# Patient Record
Sex: MALE | URBAN - METROPOLITAN AREA
[De-identification: names, ages, dates, MRNs, and addresses within clinical notes are randomized per-mention and may not be internally consistent; named-entity substitution may affect disease eponyms.]

---

## 2023-03-30 ENCOUNTER — APPOINTMENT (RX ONLY)
Dept: URBAN - METROPOLITAN AREA CLINIC 17 | Facility: CLINIC | Age: 78
Setting detail: DERMATOLOGY
End: 2023-03-30

## 2023-03-30 DIAGNOSIS — Z41.9 ENCOUNTER FOR PROCEDURE FOR PURPOSES OTHER THAN REMEDYING HEALTH STATE, UNSPECIFIED: ICD-10-CM

## 2023-03-30 PROCEDURE — ? COSMETIC CONSULTATION: LASER RESURFACING

## 2023-03-30 PROCEDURE — ? COSMETIC CONSULTATION: BLEPHAROPLASTY

## 2023-03-30 ASSESSMENT — LOCATION SIMPLE DESCRIPTION DERM
LOCATION SIMPLE: LEFT EYEBROW
LOCATION SIMPLE: LEFT CHEEK
LOCATION SIMPLE: RIGHT CHEEK
LOCATION SIMPLE: RIGHT EYEBROW

## 2023-03-30 ASSESSMENT — LOCATION DETAILED DESCRIPTION DERM
LOCATION DETAILED: LEFT SUPERIOR CENTRAL MALAR CHEEK
LOCATION DETAILED: RIGHT CENTRAL EYEBROW
LOCATION DETAILED: RIGHT SUPERIOR MEDIAL MALAR CHEEK
LOCATION DETAILED: LEFT CENTRAL EYEBROW

## 2023-03-30 ASSESSMENT — LOCATION ZONE DERM: LOCATION ZONE: FACE

## 2023-05-04 ENCOUNTER — APPOINTMENT (RX ONLY)
Dept: URBAN - METROPOLITAN AREA CLINIC 17 | Facility: CLINIC | Age: 78
Setting detail: DERMATOLOGY
End: 2023-05-04

## 2023-05-04 DIAGNOSIS — Z41.9 ENCOUNTER FOR PROCEDURE FOR PURPOSES OTHER THAN REMEDYING HEALTH STATE, UNSPECIFIED: ICD-10-CM

## 2023-05-04 PROCEDURE — ? OTHER (COSMETIC)

## 2023-05-23 ENCOUNTER — APPOINTMENT (RX ONLY)
Dept: URBAN - METROPOLITAN AREA CLINIC 41 | Facility: CLINIC | Age: 78
Setting detail: DERMATOLOGY
End: 2023-05-23

## 2023-05-23 VITALS — SYSTOLIC BLOOD PRESSURE: 131 MMHG | HEART RATE: 52 BPM | DIASTOLIC BLOOD PRESSURE: 78 MMHG

## 2023-05-23 VITALS — SYSTOLIC BLOOD PRESSURE: 148 MMHG | HEART RATE: 52 BPM | DIASTOLIC BLOOD PRESSURE: 71 MMHG

## 2023-05-23 DIAGNOSIS — Z41.9 ENCOUNTER FOR PROCEDURE FOR PURPOSES OTHER THAN REMEDYING HEALTH STATE, UNSPECIFIED: ICD-10-CM

## 2023-05-23 PROCEDURE — ? BLEPHAROPLASTY

## 2023-05-23 ASSESSMENT — LOCATION DETAILED DESCRIPTION DERM
LOCATION DETAILED: LEFT MEDIAL INFERIOR PRETARSAL REGION
LOCATION DETAILED: RIGHT LATERAL SUPERIOR EYELID

## 2023-05-23 ASSESSMENT — LOCATION ZONE DERM: LOCATION ZONE: EYELID

## 2023-05-23 ASSESSMENT — LOCATION SIMPLE DESCRIPTION DERM
LOCATION SIMPLE: RIGHT SUPERIOR EYELID
LOCATION SIMPLE: LEFT MEDIAL INFERIOR PRETARSAL REGION

## 2023-05-23 NOTE — PROCEDURE: BLEPHAROPLASTY
Body Location Override (Optional - Billing Will Still Be Based On Selected Body Map Location If Applicable): upper eyelids
Detail Level: Generalized
Estimated Blood Loss (Cc): minimal
Skin Closure: simple interrupted
Skin Closure Sutures: 6-0 fast asborbing gut
Intro: Operative Note: The risks, benefits, and alternatives of upper lid blepharoplasty were discussed with the patient. Consent forms were signed. In the preoperative area with the patient positioned upright on the surgical chair, a marker pen was used to delineate the natural lid crease in the affected lids. The amount of redundant skin to be removed utilizing a pinch technique was determined and outlined. An intravenous line was established and operative monitors were placed. IV sedation and local anesthesia were obtained. The patient was placed in the supine position, then prepped and draped in the usual sterile fashion. Iodine swabs were used around the eyelids. A 15 blade was used to make a skin incision along the elliptical demarcations to excise the skin. Electrocautery was used to achieve hemostasis. The supplemental oxygen was moved aside during the use of electrocoagulation unit. The skin edges were sutured closed using 6-0 fast absorbing gut running suture.
Wound Care Applied To Incision Site: Vaseline
Body Location Override (Optional - Billing Will Still Be Based On Selected Body Map Location If Applicable): bilateral lower eyelids
Intro: Operative Note: The risk, benefits, and alternatives of lower lid blepharoplasty were discussed with the patient. Consent forms were signed. In the preoperative area with the patient positioned upright on the surgical chair, a marker pen was used to outline the underlying, pronounced infra-orbital fat pads. An intravenous line was established and operative monitors were placed. IV sedation and location anesthesia was obtained. Local anesthesia was obtained with lidocaine 1% with epinephrine (5cc). The patient was placed in a supine position, then prepared and draped in the usual sterile fashion. Iodine swabs were used around the eyelids. A 15 blade was used to make a sub-ciliary skin incision along the length of the lower eyelid. Care was taken to avoid the duct punctae. The sub-ciliary incision was carried deep to identify the sub-orbital fat pads. Once located, the fat pads were carefully dissected free and removed. Hemostasis was meticulously achieved using electrocautery. The skin incision was closed with 6-0 fast absorbing sutures.

## 2023-05-25 ENCOUNTER — APPOINTMENT (RX ONLY)
Dept: URBAN - METROPOLITAN AREA CLINIC 17 | Facility: CLINIC | Age: 78
Setting detail: DERMATOLOGY
End: 2023-05-25

## 2023-05-25 DIAGNOSIS — Z41.9 ENCOUNTER FOR PROCEDURE FOR PURPOSES OTHER THAN REMEDYING HEALTH STATE, UNSPECIFIED: ICD-10-CM

## 2023-05-25 PROCEDURE — ? COSMETIC FOLLOW-UP

## 2023-05-25 NOTE — PROCEDURE: COSMETIC FOLLOW-UP
Side Effects Or Complications: None
Side Effects Override (Free Text): bruising and swelling WNL
Detail Level: Detailed
Treatment Override (Free Text): upper blepharoplasty
Global Improvement: Very Good
Treatment Override (Free Text): lower blepharoplasty
Patient Satisfaction: Pleased

## 2023-05-25 NOTE — HPI: COSMETIC FOLLOW UP
How Did You Tolerate The Procedure?: well, without problems
What Condition Are We Treating?: 2 day post operative
What Procedure Did We Perform At The Last Visit?: Bilateral upper and lower blepharoplasty

## 2023-05-30 ENCOUNTER — APPOINTMENT (RX ONLY)
Dept: URBAN - METROPOLITAN AREA CLINIC 41 | Facility: CLINIC | Age: 78
Setting detail: DERMATOLOGY
End: 2023-05-30

## 2023-05-30 DIAGNOSIS — H10.3 UNSPECIFIED ACUTE CONJUNCTIVITIS: ICD-10-CM

## 2023-05-30 DIAGNOSIS — Z41.9 ENCOUNTER FOR PROCEDURE FOR PURPOSES OTHER THAN REMEDYING HEALTH STATE, UNSPECIFIED: ICD-10-CM

## 2023-05-30 PROBLEM — H10.33 UNSPECIFIED ACUTE CONJUNCTIVITIS, BILATERAL: Status: ACTIVE | Noted: 2023-05-30

## 2023-05-30 PROCEDURE — ? PRESCRIPTION

## 2023-05-30 PROCEDURE — ? COUNSELING

## 2023-05-30 PROCEDURE — ? COSMETIC FOLLOW-UP

## 2023-05-30 RX ORDER — PREDNISOLONE ACETATE 10 MG/ML
SUSPENSION/ DROPS OPHTHALMIC
Qty: 1 | Refills: 1 | Status: ERX | COMMUNITY
Start: 2023-05-30

## 2023-05-30 RX ADMIN — PREDNISOLONE ACETATE: 10 SUSPENSION/ DROPS OPHTHALMIC at 00:00

## 2023-05-30 ASSESSMENT — LOCATION SIMPLE DESCRIPTION DERM
LOCATION SIMPLE: LEFT EYE
LOCATION SIMPLE: LEFT EYE
LOCATION SIMPLE: RIGHT EYE

## 2023-05-30 ASSESSMENT — LOCATION DETAILED DESCRIPTION DERM
LOCATION DETAILED: LEFT IRIS
LOCATION DETAILED: RIGHT PUPIL
LOCATION DETAILED: LEFT PUPIL

## 2023-05-30 ASSESSMENT — LOCATION ZONE DERM
LOCATION ZONE: CORNEA
LOCATION ZONE: CORNEA

## 2023-05-30 NOTE — PROCEDURE: COSMETIC FOLLOW-UP
Side Effects Or Complications: None
Treatment Override (Free Text): lower blepharoplasty
Patient Satisfaction: Pleased
Global Improvement: Good
Detail Level: Detailed
Treatment Override (Free Text): upper blepharoplasty

## 2023-05-30 NOTE — HPI: COSMETIC FOLLOW UP
How Did You Tolerate The Procedure?: other
What Condition Are We Treating?: 1 week follow up
What Procedure Did We Perform At The Last Visit?: upper and lower blepharoplasty

## 2023-06-29 ENCOUNTER — APPOINTMENT (RX ONLY)
Dept: URBAN - METROPOLITAN AREA CLINIC 17 | Facility: CLINIC | Age: 78
Setting detail: DERMATOLOGY
End: 2023-06-29

## 2023-06-29 DIAGNOSIS — Z41.9 ENCOUNTER FOR PROCEDURE FOR PURPOSES OTHER THAN REMEDYING HEALTH STATE, UNSPECIFIED: ICD-10-CM

## 2023-06-29 PROCEDURE — ? COSMETIC FOLLOW-UP

## 2023-06-29 NOTE — PROCEDURE: COSMETIC FOLLOW-UP
Side Effects Or Complications: None
Treatment Override (Free Text): Lower Blepharoplasty
Patient Satisfaction: Pleased
Global Improvement: Good
Treatment Override (Free Text): Upper Blepharoplasty
Detail Level: Detailed

## 2023-06-29 NOTE — HPI: COSMETIC FOLLOW UP
How Did You Tolerate The Procedure?: well, without problems
What Condition Are We Treating?: 1 month post
What Procedure Did We Perform At The Last Visit?: Bilateral Upper and Lower Blepharoplasty

## 2023-08-31 ENCOUNTER — APPOINTMENT (RX ONLY)
Dept: URBAN - METROPOLITAN AREA CLINIC 17 | Facility: CLINIC | Age: 78
Setting detail: DERMATOLOGY
End: 2023-08-31

## 2023-08-31 DIAGNOSIS — Z41.9 ENCOUNTER FOR PROCEDURE FOR PURPOSES OTHER THAN REMEDYING HEALTH STATE, UNSPECIFIED: ICD-10-CM

## 2023-08-31 PROCEDURE — ? COSMETIC FOLLOW-UP

## 2023-08-31 NOTE — PROCEDURE: COSMETIC FOLLOW-UP
Side Effects Override (Free Text): Patient c/o redness however Dr. Sullivan explained this is WNL and will continue to heal.
Global Improvement: Very Good
Treatment Override (Free Text): Lower Blepharoplasty
Side Effects Or Complications: None
Detail Level: Detailed
Treatment Override (Free Text): Upper Blepharoplasty